# Patient Record
Sex: MALE | Race: WHITE
[De-identification: names, ages, dates, MRNs, and addresses within clinical notes are randomized per-mention and may not be internally consistent; named-entity substitution may affect disease eponyms.]

---

## 2019-02-24 ENCOUNTER — HOSPITAL ENCOUNTER (OUTPATIENT)
Age: 49
End: 2019-02-24
Payer: COMMERCIAL

## 2019-02-24 DIAGNOSIS — R68.89: Primary | ICD-10-CM

## 2019-02-24 LAB — INFLUENZA A AND B BY PCR RAPID: (no result)

## 2019-02-24 PROCEDURE — 87400 INFLUENZA A/B EACH AG IA: CPT

## 2019-03-28 ENCOUNTER — HOSPITAL ENCOUNTER (OUTPATIENT)
Age: 49
End: 2019-03-28
Payer: COMMERCIAL

## 2019-03-28 DIAGNOSIS — R31.9: Primary | ICD-10-CM

## 2019-03-28 PROCEDURE — 87086 URINE CULTURE/COLONY COUNT: CPT

## 2019-03-29 ENCOUNTER — HOSPITAL ENCOUNTER (EMERGENCY)
Age: 49
Discharge: HOME | End: 2019-03-29
Payer: COMMERCIAL

## 2019-03-29 VITALS
RESPIRATION RATE: 18 BRPM | TEMPERATURE: 98 F | OXYGEN SATURATION: 99 % | HEART RATE: 58 BPM | SYSTOLIC BLOOD PRESSURE: 166 MMHG | DIASTOLIC BLOOD PRESSURE: 92 MMHG

## 2019-03-29 VITALS
SYSTOLIC BLOOD PRESSURE: 154 MMHG | DIASTOLIC BLOOD PRESSURE: 92 MMHG | HEART RATE: 59 BPM | OXYGEN SATURATION: 100 % | RESPIRATION RATE: 15 BRPM

## 2019-03-29 VITALS — BODY MASS INDEX: 29 KG/M2

## 2019-03-29 DIAGNOSIS — N39.0: ICD-10-CM

## 2019-03-29 DIAGNOSIS — N20.0: Primary | ICD-10-CM

## 2019-03-29 LAB
ADD MANUAL DIFF / SLIDE REVIEW: NO
BUN SERPL-MCNC: 12 MG/DL (ref 9–20)
CALCIUM SERPL-MCNC: 9.5 MG/DL (ref 8.4–10.2)
CHLORIDE SERPL-SCNC: 105 MMOL/L (ref 98–107)
CO2 SERPL-SCNC: 28 MMOL/L (ref 22–32)
ESTIMATED GLOMERULAR FILT RATE: > 60 ML/MIN (ref 60–?)
GLUCOSE SERPL-MCNC: 105 MG/DL (ref 70–100)
HEMATOCRIT: 43.4 % (ref 41–53)
HEMOGLOBIN: 14.9 G/DL (ref 13.5–17.5)
HEMOLYSIS: 16 (ref 0–50)
LYMPHOCYTES # SPEC AUTO: 1700 /UL (ref 1100–4500)
MCV RBC: 90.4 FL (ref 80–100)
MEAN CORPUSCULAR HEMOGLOBIN: 31.1 PG (ref 26–34)
MEAN CORPUSCULAR HGB CONC: 34.4 % (ref 30–36)
PLATELET COUNT: 161 X10^3/UL (ref 150–400)
POTASSIUM SERPL-SCNC: 3.9 MMOL/L (ref 3.4–5.1)
SODIUM SERPL-SCNC: 141 MMOL/L (ref 137–145)

## 2019-03-29 PROCEDURE — 74176 CT ABD & PELVIS W/O CONTRAST: CPT

## 2019-03-29 PROCEDURE — 85025 COMPLETE CBC W/AUTO DIFF WBC: CPT

## 2019-03-29 PROCEDURE — 96361 HYDRATE IV INFUSION ADD-ON: CPT

## 2019-03-29 PROCEDURE — 99283 EMERGENCY DEPT VISIT LOW MDM: CPT

## 2019-03-29 PROCEDURE — 80048 BASIC METABOLIC PNL TOTAL CA: CPT

## 2019-03-29 PROCEDURE — 36591 DRAW BLOOD OFF VENOUS DEVICE: CPT

## 2019-03-29 PROCEDURE — 99284 EMERGENCY DEPT VISIT MOD MDM: CPT

## 2019-03-29 PROCEDURE — 96374 THER/PROPH/DIAG INJ IV PUSH: CPT

## 2019-03-29 NOTE — ED.MALEGU
"HPI - Male Genitourinary
General
Chief complaint: Urogenital-Male
Stated complaint: blood in urine, pain in lower right abd
Time Seen by Provider: 03/29/19 03:27
Source: patient and family
Mode of arrival: ambulatory
Limitations: no limitations
History of Present Illness
HPI Narrative: 48-year-old nonsmoker with history of kidney stones presents to the emergency department with family in the chief complaint of worsening right flank pain.  The patient was seen and evaluated this evening at the walk-in clinic with 
chief complaint of hematuria.  At the time the patient had no pain in his flank perhaps only bit of nausea.  He denies fever chills and is not dizzy nor weak or lightheaded.  His flank pain is rather persistent but seems to be building.  It is worse 
when he moves and improves with rest.  It does on some level remind him of prior kidney stones.  Patient had urine noting leukocytes and was placed on 5 days of ciprofloxacin
Onset (ago): hour(s)
Duration: progressively worsening
Location: right flank
Severity: moderate
Quality: aching
Relieving factors: rest
Exacerbating factors: palpation and movement
Reports blood in urine
Related Data
Previous Rx's

 Medication  Instructions  Recorded
ciprofloxacin 500 mg tablet 500 mg PO BID 5 Days #10 tab 03/28/19
ciprofloxacin HCl 500 mg PO BID #10 tab 03/29/19
hydrocodone-acetaminophen 1 tab PO Q4-6H PRN #10 tab 03/29/19
ketorolac 10 mg PO Q6H PRN #14 tab 03/29/19
ondansetron 4 mg PO TID-QID PRN #10 tab 03/29/19
tamsulosin [Flomax] 0.4 mg PO DAILY #10 cap 03/29/19


Allergies

Allergy/AdvReac Type Severity Reaction Status Date / Time
No Known Drug Allergies Allergy   Verified 03/28/19 19:39



Review of Systems
Constitutional
Denies chills, Denies fever(s), Denies lethargy and Denies weakness
Eyes
Denies change in vision, Denies eye discharge, Denies irritation and Denies loss of vision
ENT
Ears, Nose, Mouth, and Throat: Denies change in voice, Denies neck pain and Denies sore throat
Cardiovascular
Denies chest pain, Denies irregular heart rhythm, Denies lightheadedness, Denies palpitations, Denies dyspnea, Denies dyspnea on exertion and Denies orthopnea
Respiratory
Denies cough, Denies dyspnea, Denies dyspnea on exertion and Denies wheezing
Gastrointestinal
Gastrointestinal: Denies abdominal pain, Denies change in bowel habits, Denies diarrhea, Denies nausea and Denies vomiting
Genitourinary
Reports hematuria, Reports flank pain, Denies urinary incontinence and Denies urinary urgency
Musculoskeletal
Reports back pain and Denies neck pain
Integumentary/Breasts
Denies pruritus, Denies erythema, Denies rash and Denies wounds
Neurologic
Denies confusion, Denies loss of vision and Denies weakness
Psychiatric
Denies anxiety, Denies confusion, Denies depression, Denies homicidal ideation and Denies suicidal ideation
Endocrine
Denies palpitations
Hematologic/Lymphatic
Denies easy bruising
Allergic/Immunologic
Denies wheezing

PFSH
Social History
Smoking Status:  Never smoker 


Social History (Reviewed 03/29/19 @ 05:15 by Tank Muse DO)
Smoking Status:  Never smoker 



Exam
Narrative
Exam Narrative: GENERAL: This is a well-nourished, well-developed patient, in mild distress.
HEAD: Atraumatic. Normocephalic. No temporal or scalp tenderness.
EYES: Pupils equal round and reactive. Extraocular motions intact. No scleral icterus. No injection or drainage. 
ENT: Nose without bleeding, purulent drainage or septal hematoma. Throat without erythema, tonsillar hypertrophy or exudate. Uvula midline. Airway patent.
NECK: Trachea midline. No JVD or lymphadenopathy. Supple, nontender, no meningeal signs.
CARDIOVASCULAR: Regular rate and rhythm without murmurs, gallops, or rubs. 
RESPIRATORY: Clear to auscultation. Breath sounds equal bilaterally. No wheezes, rales, or rhonchi.  
GASTROINTESTINAL: Abdomen soft, non-tender, nondistended. No hepato-splenomegaly, or palpable masses. No guarding.
EXTREMITIES: No 
[id number]|2019-03-29 04:15:00|2019-03-29 03:40:00|ED_ITS|[facility]|Emergency Department|[id number]|"HPI - Male Genitourinary

## 2019-03-29 NOTE — DI.CT.S_ITS
PROCEDURE:  CT KIDNEY URETER BLADDER (KUB)  
   
INDICATIONS:  severe Right lower quadrant/flank pain  
   
TECHNIQUE:    
Noncontrast 5 mm thick sections acquired from the diaphragms to the symphysis.  5 mm   
thick coronal and sagittal reformats were then performed.  For radiation dose reduction,   
the following was used:  automated exposure control, adjustment of mA and/or kV according   
to patient size.    
   
COMPARISON:  None.  
   
FINDINGS:    
Image quality:  Excellent.    
   
Lung bases: No pulmonary infiltrate. There is a 3 mm subpleural lung nodule in the left   
lower lobe (series 3 image 1).  Heart size is normal.  There is a tiny hiatal hernia.  
   
Urinary system:  A 2.5 mm stone is present in the distal right ureter at the uterovesical   
junction. There is mild right hydronephrosis and perinephric stranding. There are 3 2-4   
mm nonobstructive stone in the left kidney. Both kidneys are normal in size.  Both   
ureters appear non-dilated throughout their expected courses.  Bladder wall thickness is   
normal; no calcified bladder stones.  Enlarged prostate.  
   
Other solid organs:  Liver is normal in size.  Gallbladder is normal.  Pancreas is normal   
in contours.  Spleen is normal in size.  A small splenule is noted. No adrenal nodules.    
   
Peritoneum and bowel:  Unenhanced bowel loops demonstrate normal wall thickness and   
caliber. The appendix is normal. No free fluid or air.  Mild mesenteric stranding.  
   
Nodes and vessels:  No retroperitoneal or mesenteric adenopathy by size criteria.  Aorta   
and inferior vena cava are normal in caliber.    
   
Abdominal wall:  No ventral hernias.    
   
Pelvis:  No free pelvic fluid.  No inguinal hernias or adenopathy.    
   
Bones:  No suspicious bony lesions.  No vertebral body compression fractures.    
   
IMPRESSION:  
   
1. A 2.5 mm nonobstructive stone in the distal right ureter at the ureterovesical   
junction. There is mild right hydronephrosis and perinephric stranding.  
   
2. Three small nonobstructive left renal calculi. No left hydronephrosis.  
   
3. Normal appendix.  
   
   
4. A 3 mm subpleural nodule in the left lower lobe. Please see enclosed followup   
recommendation.  
   
5. Enlarged prostate.  
   
No significant discrepancy with the night shift radiology preliminary report.  
   
Fleischner Society criteria for SOLID lung nodule followup.    
Nodule size (mm)Low-risk patientHigh-risk patient?4No follow-up neededFollow-up at 12 mo;   
if no change, no further follow-up>2-9Edqynd-gx CT at 12 mo; if no change, no further   
follow-up needed.Initial follow-up CT at 6-12 mo, then 18-24 mo if no change.    
>6-8Initial follow-up CT at 6-12 mo, then 18-24 mo if no change. Initial follow-up CT at   
3-6 mo, then 9-12 mo and 24 mo if no change.  >8Follow-up CT at 3, 9, 24 mo.  Or PET   
and/or biopsy.Same as for low-risk pts.    
   
   
Dictated by: Anali Kerr M.D. on 3/29/2019 at 7:57       
Approved by: Anali Kerr M.D. on 3/29/2019 at 8:24

## 2019-09-27 ENCOUNTER — HOSPITAL ENCOUNTER (EMERGENCY)
Age: 49
Discharge: HOME | End: 2019-09-27
Payer: COMMERCIAL

## 2019-09-27 VITALS
DIASTOLIC BLOOD PRESSURE: 94 MMHG | RESPIRATION RATE: 15 BRPM | TEMPERATURE: 98.24 F | OXYGEN SATURATION: 96 % | HEART RATE: 77 BPM | SYSTOLIC BLOOD PRESSURE: 183 MMHG

## 2019-09-27 VITALS
OXYGEN SATURATION: 96 % | HEART RATE: 78 BPM | DIASTOLIC BLOOD PRESSURE: 87 MMHG | RESPIRATION RATE: 15 BRPM | SYSTOLIC BLOOD PRESSURE: 148 MMHG

## 2019-09-27 VITALS — BODY MASS INDEX: 28.1 KG/M2

## 2019-09-27 DIAGNOSIS — N23: Primary | ICD-10-CM

## 2019-09-27 LAB
ADD MANUAL DIFF / SLIDE REVIEW: NO
BUN SERPL-MCNC: 16 MG/DL (ref 9–20)
CALCIUM SERPL-MCNC: 9.2 MG/DL (ref 8.4–10.2)
CHLORIDE SERPL-SCNC: 103 MMOL/L (ref 98–107)
CO2 SERPL-SCNC: 29 MMOL/L (ref 22–32)
ESTIMATED GLOMERULAR FILT RATE: 58.4 ML/MIN (ref 60–?)
GLUCOSE SERPL-MCNC: 132 MG/DL (ref 70–100)
HEMATOCRIT: 43.2 % (ref 41–53)
HEMOGLOBIN: 14.8 G/DL (ref 13.5–17.5)
HEMOLYSIS: 23 (ref 0–50)
LYMPHOCYTES # SPEC AUTO: 2300 /UL (ref 1100–4500)
MCV RBC: 91.1 FL (ref 80–100)
MEAN CORPUSCULAR HEMOGLOBIN: 31.3 PG (ref 26–34)
MEAN CORPUSCULAR HGB CONC: 34.3 % (ref 30–36)
PLATELET COUNT: 187 X10^3/UL (ref 150–400)
POTASSIUM SERPL-SCNC: 3.9 MMOL/L (ref 3.4–5.1)
SODIUM SERPL-SCNC: 140 MMOL/L (ref 137–145)

## 2019-09-27 PROCEDURE — 96361 HYDRATE IV INFUSION ADD-ON: CPT

## 2019-09-27 PROCEDURE — 96375 TX/PRO/DX INJ NEW DRUG ADDON: CPT

## 2019-09-27 PROCEDURE — 81003 URINALYSIS AUTO W/O SCOPE: CPT

## 2019-09-27 PROCEDURE — 36415 COLL VENOUS BLD VENIPUNCTURE: CPT

## 2019-09-27 PROCEDURE — 96374 THER/PROPH/DIAG INJ IV PUSH: CPT

## 2019-09-27 PROCEDURE — 99283 EMERGENCY DEPT VISIT LOW MDM: CPT

## 2019-09-27 PROCEDURE — 85025 COMPLETE CBC W/AUTO DIFF WBC: CPT

## 2019-09-27 PROCEDURE — 99284 EMERGENCY DEPT VISIT MOD MDM: CPT

## 2019-09-27 PROCEDURE — 80048 BASIC METABOLIC PNL TOTAL CA: CPT

## 2019-09-30 ENCOUNTER — HOSPITAL ENCOUNTER (EMERGENCY)
Age: 49
Discharge: HOME | End: 2019-09-30
Payer: COMMERCIAL

## 2019-09-30 VITALS
TEMPERATURE: 97.8 F | RESPIRATION RATE: 15 BRPM | DIASTOLIC BLOOD PRESSURE: 95 MMHG | SYSTOLIC BLOOD PRESSURE: 171 MMHG | OXYGEN SATURATION: 100 % | HEART RATE: 72 BPM

## 2019-09-30 VITALS — BODY MASS INDEX: 28.8 KG/M2

## 2019-09-30 VITALS — OXYGEN SATURATION: 99 % | HEART RATE: 68 BPM | RESPIRATION RATE: 15 BRPM

## 2019-09-30 DIAGNOSIS — N20.0: Primary | ICD-10-CM

## 2019-09-30 LAB
ADD MANUAL DIFF / SLIDE REVIEW: NO
ALBUMIN SERPL-MCNC: 4.2 G/DL (ref 3.5–5)
ALBUMIN/GLOB SERPL: 1.4 {RATIO} (ref 1–2.8)
ALP SERPL-CCNC: 68 U/L (ref 38–126)
ALT SERPL-CCNC: 90 IU/L (ref 21–72)
BUN SERPL-MCNC: 12 MG/DL (ref 9–20)
CALCIUM SERPL-MCNC: 9.5 MG/DL (ref 8.4–10.2)
CHLORIDE SERPL-SCNC: 106 MMOL/L (ref 98–107)
CO2 SERPL-SCNC: 28 MMOL/L (ref 22–32)
ESTIMATED GLOMERULAR FILT RATE: > 60 ML/MIN (ref 60–?)
GLOBULIN SER CALC-MCNC: 3 G/DL (ref 1.7–4.1)
GLUCOSE SERPL-MCNC: 95 MG/DL (ref 70–100)
HEMATOCRIT: 41.5 % (ref 41–53)
HEMOGLOBIN: 14.3 G/DL (ref 13.5–17.5)
HEMOLYSIS: < 15 (ref 0–50)
INR PPP: 1 (ref 0.9–1.3)
LIPASE SERPL-CCNC: 422 U/L (ref 23–300)
LYMPHOCYTES # SPEC AUTO: 1600 /UL (ref 1100–4500)
MCV RBC: 91 FL (ref 80–100)
MEAN CORPUSCULAR HEMOGLOBIN: 31.3 PG (ref 26–34)
MEAN CORPUSCULAR HGB CONC: 34.4 % (ref 30–36)
PLATELET COUNT: 170 X10^3/UL (ref 150–400)
POTASSIUM SERPL-SCNC: 4.3 MMOL/L (ref 3.4–5.1)
PROT SERPL-MCNC: 7.2 G/DL (ref 6.3–8.2)
PROTHROMBIN TIME: 11.8 SECONDS (ref 10.1–12.7)
PTT PARTIAL THROMBOPLASTIN TIM: 33 SECONDS (ref 26.4–36.2)
SODIUM SERPL-SCNC: 143 MMOL/L (ref 137–145)

## 2019-09-30 PROCEDURE — 80053 COMPREHEN METABOLIC PANEL: CPT

## 2019-09-30 PROCEDURE — 81003 URINALYSIS AUTO W/O SCOPE: CPT

## 2019-09-30 PROCEDURE — 36415 COLL VENOUS BLD VENIPUNCTURE: CPT

## 2019-09-30 PROCEDURE — 99282 EMERGENCY DEPT VISIT SF MDM: CPT

## 2019-09-30 PROCEDURE — 85730 THROMBOPLASTIN TIME PARTIAL: CPT

## 2019-09-30 PROCEDURE — 74176 CT ABD & PELVIS W/O CONTRAST: CPT

## 2019-09-30 PROCEDURE — 85610 PROTHROMBIN TIME: CPT

## 2019-09-30 PROCEDURE — 85025 COMPLETE CBC W/AUTO DIFF WBC: CPT

## 2019-09-30 PROCEDURE — 99284 EMERGENCY DEPT VISIT MOD MDM: CPT

## 2019-09-30 PROCEDURE — 83690 ASSAY OF LIPASE: CPT

## 2019-09-30 PROCEDURE — 81015 MICROSCOPIC EXAM OF URINE: CPT

## 2019-09-30 NOTE — ED_ITS
"HPI - Male Genitourinary    
General    
Chief complaint: Urogenital-Male    
Stated complaint: Kidney stones    
Time Seen by Provider: 09/30/19 10:49    
Source: patient    
Mode of arrival: Ambulatory    
History of Present Illness    
HPI Narrative: Patient is a 48-year-old male who presents with left-sided flank   
pain.  He has a history of kidney stones he was seen evaluated here 3 nights ago  
with the same thought to be kidney stone no imaging was done at that time.    
However it does not be moving or radiating around to his front.  He continues to  
have blood in his urine.  He denies any nausea or vomiting.  He does have pain   
medication at home seems to be helping.  Worried that this stone is not moving   
and might be large.    
Related Data    
                                  Previous Rx's    
    
    
    
 Medication  Instructions  Recorded    
     
tamsulosin [Flomax] 0.4 mg PO DAILY #10 cap 03/29/19    
     
hydrocodone-acetaminophen 1 tab PO Q4-6H PRN #10 tab 09/27/19    
     
ketorolac 10 mg PO Q6H PRN #14 tab 09/27/19    
    
    
    
                                    Allergies    
    
    
    
Allergy/AdvReac Type Severity Reaction Status Date / Time    
     
No Known Drug Allergies Allergy   Verified 09/30/19 10:30    
    
    
    
    
Review of Systems    
Review of Systems    
Narrative: GENERAL: Denies chills, fatigue, malaise, fever, sweats, travel    
HEENT: Denies sinus pain, ear pain, sore throat, difficulty swallowing, neck   
pain    
RESPIRATORY: Denies dyspnea, cough, wheezing, hemoptysis, sputum.    
CARDIOVASCULAR: Denies chest pain, palpitations, orthopnea, edema     
GASTROINTESTINAL: Denies nausea, vomiting, abdominal pain, diarrhea,   
constipation, melena.    
:  see HPI    
MUSCULOSKELETAL: Denies weakness, joint pain, or bony pain    
SKIN: No rash, no erythema, no pruritus    
NEUROLOGIC: Denies weakness, dizziness, headache, numbness, change in speech,   
confusion    
PSYCHIATRIC: No concerning psychosocial issues.    
    
12 point review of systems is negative except for those stated above and HPI    
    
    
PFSH    
Medical History (Reviewed 09/30/19 @ 11:05 by Benita Garcia DO)    
    
Kidney stones (Acute)    
    
    
Social History (Reviewed 09/27/19 @ 00:43 by Tank Muse DO)    
Smoking Status:  Never smoker     
    
    
Social History (Reviewed 09/30/19 @ 11:05 by Benita Garcia DO)    
Smoking Status:  Never smoker     
    
    
    
Exam    
Initial Vital Signs    
Initial Vital Signs:               Vital Signs    
    
    
    
Temperature  97.8 F   09/30/19 10:30    
     
Pulse Rate  72   09/30/19 10:30    
     
Respiratory Rate  15   09/30/19 10:30    
     
Blood Pressure  171/95 H  09/30/19 10:30    
     
Pulse Oximetry  100   09/30/19 10:30    
    
    
GENERAL:  Sitting comfortable reading a magazine.    
HEENT: Head atraumatic,EOMI, pupils reactive, face symmetric, [moist] mucous   
membranes    
CARDIOVASCULAR: Regular rate and rhythm without murmurs, rubs or gallops.    
RESPIRATORY: Breath sounds equal bilaterally, no wheezes rales or rhonchi.    
ABDO minimal left CVA tenderness    
EXTREMITIES: Normal range of motion, no clubbing or edema.  Neurovascularly   
intact    
NEUROLOGICAL: Alert and oriented x4.Normal gait and speech. Cranial nerves II   
through XII grossly intact.       
SKIN: Warm, dry, no laceration, no petechiae, no rashes or lesions.    
    
Course    
Orders    
Ordered:                            ED Orders    
    
09/30/19 10:40    
Urine Microscopic Stat     
    
09/30/19 11:11    
CT kidney ureter bladder (KUB) Stat     
    
09/30/19 11:25    
Complete Blood Count AUTO DIFF Stat     
Comprehensive Metabolic Panel Stat     
Lipase Stat     
Partial Thromboplastin Time Stat     
Prothrombin Time INR Stat     
    
    
    
Vital Signs    
Vital signs:                   Vi
[id number]|2019-09-30 11:11:00|2019-09-30 11:29:00|DI.CT.S_ITS|[facility]|Imaging|[id number]|"PROCEDURE:  CT KIDNEY URETER BLADDER (KUB)

## 2019-09-30 NOTE — PC.NURSE
Pt called stating he feels well but still has blood in his urine and flank pain. Has had on and off fever to be rechecked.

## 2020-10-21 ENCOUNTER — HOSPITAL ENCOUNTER (OUTPATIENT)
Age: 50
End: 2020-10-21
Payer: COMMERCIAL

## 2020-10-21 DIAGNOSIS — N50.89: Primary | ICD-10-CM

## 2020-10-21 DIAGNOSIS — N50.3: ICD-10-CM

## 2020-10-21 DIAGNOSIS — N43.42: ICD-10-CM

## 2020-10-21 PROCEDURE — 76870 US EXAM SCROTUM: CPT

## 2020-10-21 NOTE — DI.US.S_ITS
PROCEDURE:  US SCROTUM  
   
INDICATIONS:  LEFT SCROTAL SWELLING AND DISCOMFORT  
   
TECHNIQUE:    
Real-time scanning was performed of the scrotum and testicles, with image documentation.    
Color and pulse Doppler interrogation was performed of both testicles.    
   
COMPARISON:  None.  
   
FINDINGS:    
   
Right:  Testicle is normal in size at 4.4 x 1.9 x 3.7 cm, and homogenous in echotexture.    
Epididymis is normal in overall size and morphology.  Epididymal cysts/spermatoceles   
measuring 5-6 mm No hydrocele or varicoceles.  Overlying scrotal skin is normal in   
thickness.    
   
Left:  Testicle is normal in size at 4.2 x 2.0 x 3.4 cm, and homogeneous in echotexture.    
Epididymis is mildly prominent in size, otherwise unremarkable.  Epididymal   
cyst/spermatocele with debris measuring 7 mm.  Simple appearing epididymal cyst or   
spermatocele measuring 6 mm. No hydrocele or varicoceles.  Overlying scrotal skin is   
normal in thickness.    
   
Doppler:  Color and pulse Doppler demonstrate normal and symmetric arterial flow in both   
testicles.    
   
IMPRESSION:    
   
Multiple epididymal cysts/spermatoceles.  
   
Otherwise, negative examination as above  
   
   
Dictated by: Sam Ascencio M.D. on 10/21/2020 at 12:47       
Approved by: Sam Ascencio M.D. on 10/21/2020 at 12:49

## 2022-05-12 NOTE — PC.NURSE
----- Message from Natalee Shipley sent at 5/12/2022  3:04 PM CDT -----  Contact: [phone]  Requesting an RX refill or new RX.  Is this a refill or new RX: new  RX name and strength (copy/paste from chart):  metFORMIN (GLUCOPHAGE) 500 MG tablet  Is this a 30 day or 90 day RX:   Pharmacy name and phone # (copy/paste from chart):    [facility] - [facility] [address]  [address] [address]  Phone: [phone] Fax: [phone]  The doctors have asked that we provide their patients with the following 2 reminders -- prescription refills can take up to 72 hours, and a friendly reminder that in the future you can use your [facility] account to request refills: Call pt    Pt is out needs the med.     Spoke with patient on the phone, states he had been called from [facility]. This RN could not find any record of pt being called from ED. Pt had previously been seen at the Walk-in, instructed him to call them in the morning. Also answered additional 
questions. Encouraged pt to be seen at by primary for a follow up. Pt happy with questions being addressed.

## 2023-04-18 ENCOUNTER — HOSPITAL ENCOUNTER (OUTPATIENT)
Dept: HOSPITAL 76 - EMS | Age: 53
Discharge: LEFT BEFORE DISCHARGE | End: 2023-04-18
Payer: COMMERCIAL

## 2023-04-18 DIAGNOSIS — R55: Primary | ICD-10-CM

## 2023-04-18 DIAGNOSIS — R42: ICD-10-CM

## 2023-04-18 DIAGNOSIS — R11.2: ICD-10-CM

## 2023-04-18 DIAGNOSIS — R19.7: ICD-10-CM

## 2023-04-19 ENCOUNTER — HOSPITAL ENCOUNTER (EMERGENCY)
Age: 53
Discharge: HOME | End: 2023-04-19
Payer: COMMERCIAL

## 2023-04-19 VITALS — OXYGEN SATURATION: 99 % | HEART RATE: 79 BPM | RESPIRATION RATE: 18 BRPM

## 2023-04-19 VITALS
HEART RATE: 79 BPM | OXYGEN SATURATION: 98 % | SYSTOLIC BLOOD PRESSURE: 121 MMHG | DIASTOLIC BLOOD PRESSURE: 66 MMHG | RESPIRATION RATE: 15 BRPM

## 2023-04-19 VITALS
OXYGEN SATURATION: 98 % | DIASTOLIC BLOOD PRESSURE: 60 MMHG | HEART RATE: 76 BPM | SYSTOLIC BLOOD PRESSURE: 107 MMHG | RESPIRATION RATE: 15 BRPM

## 2023-04-19 VITALS
RESPIRATION RATE: 19 BRPM | HEART RATE: 78 BPM | DIASTOLIC BLOOD PRESSURE: 68 MMHG | SYSTOLIC BLOOD PRESSURE: 121 MMHG | OXYGEN SATURATION: 100 %

## 2023-04-19 VITALS
SYSTOLIC BLOOD PRESSURE: 106 MMHG | HEART RATE: 74 BPM | RESPIRATION RATE: 17 BRPM | OXYGEN SATURATION: 98 % | DIASTOLIC BLOOD PRESSURE: 62 MMHG

## 2023-04-19 VITALS
RESPIRATION RATE: 14 BRPM | SYSTOLIC BLOOD PRESSURE: 115 MMHG | OXYGEN SATURATION: 96 % | DIASTOLIC BLOOD PRESSURE: 64 MMHG | HEART RATE: 77 BPM

## 2023-04-19 VITALS
DIASTOLIC BLOOD PRESSURE: 80 MMHG | OXYGEN SATURATION: 99 % | SYSTOLIC BLOOD PRESSURE: 146 MMHG | RESPIRATION RATE: 18 BRPM | HEART RATE: 80 BPM | TEMPERATURE: 97.1 F

## 2023-04-19 VITALS
SYSTOLIC BLOOD PRESSURE: 105 MMHG | HEART RATE: 79 BPM | RESPIRATION RATE: 22 BRPM | DIASTOLIC BLOOD PRESSURE: 70 MMHG | OXYGEN SATURATION: 97 %

## 2023-04-19 VITALS — BODY MASS INDEX: 30.2 KG/M2

## 2023-04-19 VITALS
RESPIRATION RATE: 13 BRPM | HEART RATE: 75 BPM | SYSTOLIC BLOOD PRESSURE: 109 MMHG | OXYGEN SATURATION: 97 % | DIASTOLIC BLOOD PRESSURE: 59 MMHG

## 2023-04-19 DIAGNOSIS — R07.9: ICD-10-CM

## 2023-04-19 DIAGNOSIS — K52.9: Primary | ICD-10-CM

## 2023-04-19 LAB
ADD MANUAL DIFF / SLIDE REVIEW: NO
ALBUMIN SERPL-MCNC: 4 G/DL (ref 3.5–5)
ALBUMIN/GLOB SERPL: 1.1 {RATIO} (ref 1–2.8)
ALP SERPL-CCNC: 72 U/L (ref 38–126)
ALT SERPL-CCNC: 74 IU/L (ref ?–50)
BUN SERPL-MCNC: 15 MG/DL (ref 9–20)
CALCIUM SERPL-MCNC: 8.6 MG/DL (ref 8.4–10.2)
CHLORIDE SERPL-SCNC: 99 MMOL/L (ref 98–107)
CO2 SERPL-SCNC: 28 MMOL/L (ref 22–32)
ESTIMATED GLOMERULAR FILT RATE: 57 ML/MIN (ref 60–?)
GLOBULIN SER CALC-MCNC: 3.5 G/DL (ref 1.7–4.1)
GLUCOSE SERPL-MCNC: 119 MG/DL (ref 70–100)
HEMATOCRIT: 47.4 % (ref 41–53)
HEMOGLOBIN: 16.4 G/DL (ref 13.5–17.5)
HEMOLYSIS: < 15 (ref 0–50)
LACTATE SERPL-MCNC: 0.8 MMOL/L (ref 0.7–2.1)
LYMPHOCYTES # SPEC AUTO: 400 /UL (ref 1100–4500)
MCV RBC: 90.1 FL (ref 80–100)
MEAN CORPUSCULAR HEMOGLOBIN: 31.1 PG (ref 26–34)
MEAN CORPUSCULAR HGB CONC: 34.6 % (ref 30–36)
PLATELET COUNT: 156 X10^3/UL (ref 150–400)
POTASSIUM SERPL-SCNC: 3.3 MMOL/L (ref 3.4–5.1)
PROT SERPL-MCNC: 7.5 G/DL (ref 6.3–8.2)
SODIUM SERPL-SCNC: 134 MMOL/L (ref 137–145)

## 2023-04-19 PROCEDURE — 93010 ELECTROCARDIOGRAM REPORT: CPT

## 2023-04-19 PROCEDURE — 99284 EMERGENCY DEPT VISIT MOD MDM: CPT

## 2023-04-19 PROCEDURE — 80053 COMPREHEN METABOLIC PANEL: CPT

## 2023-04-19 PROCEDURE — 96361 HYDRATE IV INFUSION ADD-ON: CPT

## 2023-04-19 PROCEDURE — 93005 ELECTROCARDIOGRAM TRACING: CPT

## 2023-04-19 PROCEDURE — 85025 COMPLETE CBC W/AUTO DIFF WBC: CPT

## 2023-04-19 PROCEDURE — 96360 HYDRATION IV INFUSION INIT: CPT

## 2023-04-19 PROCEDURE — 83605 ASSAY OF LACTIC ACID: CPT

## 2023-04-19 PROCEDURE — 36415 COLL VENOUS BLD VENIPUNCTURE: CPT

## 2024-07-22 ENCOUNTER — HOSPITAL ENCOUNTER (OUTPATIENT)
Dept: HOSPITAL 76 - DI.N | Age: 54
Discharge: HOME | End: 2024-07-22
Attending: STUDENT IN AN ORGANIZED HEALTH CARE EDUCATION/TRAINING PROGRAM
Payer: COMMERCIAL

## 2024-07-22 DIAGNOSIS — M19.012: Primary | ICD-10-CM
